# Patient Record
Sex: MALE | Race: WHITE | NOT HISPANIC OR LATINO | Employment: FULL TIME | URBAN - NONMETROPOLITAN AREA
[De-identification: names, ages, dates, MRNs, and addresses within clinical notes are randomized per-mention and may not be internally consistent; named-entity substitution may affect disease eponyms.]

---

## 2018-12-09 ENCOUNTER — HOSPITAL ENCOUNTER (EMERGENCY)
Facility: HOSPITAL | Age: 43
Discharge: HOME | End: 2018-12-09
Attending: EMERGENCY MEDICINE | Admitting: EMERGENCY MEDICINE

## 2018-12-09 VITALS
DIASTOLIC BLOOD PRESSURE: 80 MMHG | SYSTOLIC BLOOD PRESSURE: 141 MMHG | OXYGEN SATURATION: 96 % | WEIGHT: 196 LBS | RESPIRATION RATE: 16 BRPM | HEART RATE: 88 BPM

## 2018-12-09 DIAGNOSIS — R04.0 EPISTAXIS: Primary | ICD-10-CM

## 2018-12-09 PROCEDURE — 99283 EMERGENCY DEPT VISIT LOW MDM: CPT

## 2018-12-10 NOTE — ED PROVIDER NOTES
Subjective   43-year-old male presents to the ED with chief complaint of epistaxis.  The patient states that he had a nosebleed last night but took a few hours before and pressure to resolve.  He said he had a second nosebleed that started earlier today.  He tried packing the nose with toilet paper which did not stop the bleeding.  He denies lightheadedness or dizziness.  No known trauma or injury.  No other complaints at this time.            Review of Systems   HENT: Positive for nosebleeds.    All other systems reviewed and are negative.      History reviewed. No pertinent past medical history.    No Known Allergies    Past Surgical History:   Procedure Laterality Date   • APPENDECTOMY     • NASAL SEPTUM SURGERY         History reviewed. No pertinent family history.    Social History     Socioeconomic History   • Marital status:      Spouse name: Not on file   • Number of children: Not on file   • Years of education: Not on file   • Highest education level: Not on file   Tobacco Use   • Smoking status: Former Smoker   Substance and Sexual Activity   • Alcohol use: Yes     Frequency: Never     Comment: socially    • Drug use: No           Objective   Physical Exam   Constitutional: He is oriented to person, place, and time. He appears well-developed and well-nourished. No distress.   HENT:   Head: Normocephalic and atraumatic.   Nose: Nose normal.   Epistaxis, pressure device in place    Eyes: Conjunctivae and EOM are normal. Pupils are equal, round, and reactive to light.   Cardiovascular: Normal rate and regular rhythm.   Pulmonary/Chest: Effort normal and breath sounds normal. No respiratory distress.   Abdominal: Soft. He exhibits no distension. There is no tenderness.   Musculoskeletal: He exhibits no edema or deformity.   Neurological: He is alert and oriented to person, place, and time. No cranial nerve deficit. Coordination normal.   Skin: He is not diaphoretic.   Nursing note and vitals  reviewed.      Procedures           ED Course      Presented with epistaxis.  Bleeding was controlled with pressure.  Reexamination patient did have a large clot in the left naris.  I offered removal of the clot and packing which the patient refused.  Patient will be discharged follow-up as needed he is agreeable with this plan.          [facility]      Final diagnoses:   Epistaxis            Abel Busch, DO  12/10/18 0247

## 2019-01-21 ENCOUNTER — TRANSCRIBE ORDERS (OUTPATIENT)
Dept: ADMINISTRATIVE | Facility: HOSPITAL | Age: 44
End: 2019-01-21

## 2019-01-21 DIAGNOSIS — M54.2 NECK PAIN: Primary | ICD-10-CM

## 2019-01-22 ENCOUNTER — HOSPITAL ENCOUNTER (OUTPATIENT)
Dept: MRI IMAGING | Facility: HOSPITAL | Age: 44
Discharge: HOME | End: 2019-01-22
Admitting: CHIROPRACTOR

## 2019-01-22 DIAGNOSIS — M54.2 NECK PAIN: ICD-10-CM

## 2019-01-22 PROCEDURE — 72141 MRI NECK SPINE W/O DYE: CPT

## 2019-12-11 NOTE — PROGRESS NOTES
Patient: Ezekiel Ray    YOB: 1975    Date: 12/13/2019    Primary Care Provider: Ranulfo Douglass APRN    Reason for Consultation: Lesion    Chief Complaint   Patient presents with   • Cyst     right ear       Subjective .     History of present illness:  I saw the patient in the office  today as a consultation for evaluation and treatment of a cyst on the ear.  Patient has had this for the last 2 or so months.  He has had multiple episodes of inflammation and drainage relating to that cyst on the right earlobe.  He states that he has a significant amount of pain during those episodes.  Feeling better currently.    The following portions of the patient's history were reviewed and updated as appropriate: allergies, current medications, past family history, past medical history, past social history, past surgical history and problem list.    Review of Systems   Constitutional: Negative for chills, fever and unexpected weight change.   HENT: Negative for trouble swallowing and voice change.    Eyes: Negative for visual disturbance.   Respiratory: Negative for apnea, cough, chest tightness, shortness of breath and wheezing.    Cardiovascular: Negative for chest pain, palpitations and leg swelling.   Gastrointestinal: Negative for abdominal distention, abdominal pain, anal bleeding, blood in stool, constipation, diarrhea, nausea, rectal pain and vomiting.   Endocrine: Negative for cold intolerance and heat intolerance.   Genitourinary: Negative for difficulty urinating, dysuria, flank pain, scrotal swelling and testicular pain.   Musculoskeletal: Negative for back pain, gait problem and joint swelling.   Skin: Negative for color change, rash and wound.   Neurological: Negative for dizziness, syncope, speech difficulty, weakness, numbness and headaches.   Hematological: Negative for adenopathy. Does not bruise/bleed easily.   Psychiatric/Behavioral: Negative for confusion. The patient is not  "nervous/anxious.        History:  History reviewed. No pertinent past medical history.    Past Surgical History:   Procedure Laterality Date   • APPENDECTOMY     • NASAL SEPTUM SURGERY         Family History   Problem Relation Age of Onset   • No Known Problems Mother    • Cancer Father        Social History     Tobacco Use   • Smoking status: Former Smoker   • Smokeless tobacco: Never Used   Substance Use Topics   • Alcohol use: Yes     Frequency: Never     Comment: socially    • Drug use: No        Allergies:  No Known Allergies    Medications:    Current Outpatient Medications:   •  doxycycline (MONODOX) 100 MG capsule, 1 po bid, Disp: 20 capsule, Rfl: 0  •  lansoprazole (PREVACID) 30 MG capsule, lansoprazole 30 mg capsule,delayed release  Take one capsule every morning, Disp: , Rfl:   •  ondansetron ODT (ZOFRAN-ODT) 4 MG disintegrating tablet, Take 1 tablet by mouth Every 8 (Eight) Hours As Needed for Nausea or Vomiting., Disp: 15 tablet, Rfl: 0    Objective     Vital Signs:   Vitals:    12/13/19 0916   BP: 118/70   Pulse: 68   Temp: 97.4 °F (36.3 °C)   SpO2: 97%   Weight: 87.5 kg (193 lb)   Height: 180.3 cm (71\")       Physical Exam:     General Appearance:   Alert and oriented and in no acute distress   Skin: Right earlobe with findings consistent with a sebaceous cyst with chronic drainage site posteriorly in the earlobe     Results Review:   None    Review of Systems was reviewed and confirmed as accurate.    Assessment/Plan     1. Sebaceous cyst      I explained the diagnosis to him.  He understands that this will likely continue to recur without excision.  I have offered him excision and he understands the procedure and the reason for the procedure.  He also understands the risks of bleeding and infection as well as recurrence and he wishes to proceed    1% lidocaine was used locally after the right earlobe was prepped and draped in usual fashion.  An elliptical incision was made around the drainage site " posteriorly and deeper dissection was performed and full-thickness excision was performed.  6 mm in size.  Wound was closed with interrupted simple nylon closure.  Patient tolerated procedure well and there were no complications.  Low up next week for suture removal.  Wound care instructions were otherwise given.    Electronically signed by Brown Zeng MD  12/13/19  9:34 AM

## 2019-12-13 ENCOUNTER — OFFICE VISIT (OUTPATIENT)
Dept: SURGERY | Facility: CLINIC | Age: 44
End: 2019-12-13

## 2019-12-13 VITALS
OXYGEN SATURATION: 97 % | HEART RATE: 68 BPM | BODY MASS INDEX: 27.02 KG/M2 | HEIGHT: 71 IN | TEMPERATURE: 97.4 F | DIASTOLIC BLOOD PRESSURE: 70 MMHG | SYSTOLIC BLOOD PRESSURE: 118 MMHG | WEIGHT: 193 LBS

## 2019-12-13 DIAGNOSIS — L72.3 SEBACEOUS CYST: Primary | ICD-10-CM

## 2019-12-13 PROCEDURE — 99202 OFFICE O/P NEW SF 15 MIN: CPT | Performed by: SURGERY

## 2019-12-13 PROCEDURE — 11441 EXC FACE-MM B9+MARG 0.6-1 CM: CPT | Performed by: SURGERY

## 2019-12-18 ENCOUNTER — OFFICE VISIT (OUTPATIENT)
Dept: SURGERY | Facility: CLINIC | Age: 44
End: 2019-12-18

## 2019-12-18 VITALS
HEART RATE: 74 BPM | HEIGHT: 71 IN | BODY MASS INDEX: 27.02 KG/M2 | WEIGHT: 193 LBS | SYSTOLIC BLOOD PRESSURE: 118 MMHG | TEMPERATURE: 97.6 F | OXYGEN SATURATION: 97 % | DIASTOLIC BLOOD PRESSURE: 70 MMHG | RESPIRATION RATE: 16 BRPM

## 2019-12-18 DIAGNOSIS — L72.3 SEBACEOUS CYST: Primary | ICD-10-CM

## 2019-12-18 DIAGNOSIS — Z48.02 ENCOUNTER FOR REMOVAL OF SUTURES: ICD-10-CM

## 2019-12-18 NOTE — PROGRESS NOTES
Patient comes in today to have suture removal from a recent excision of sebaceous cyst @ right posterior earlobe. The sutures were removed and there was no redness or drainage from the sight. The patient has no complaints.

## 2020-02-13 ENCOUNTER — HOSPITAL ENCOUNTER (OUTPATIENT)
Dept: GENERAL RADIOLOGY | Facility: HOSPITAL | Age: 45
Discharge: HOME | End: 2020-02-13
Admitting: CHIROPRACTOR

## 2020-02-13 ENCOUNTER — TRANSCRIBE ORDERS (OUTPATIENT)
Dept: GENERAL RADIOLOGY | Facility: HOSPITAL | Age: 45
End: 2020-02-13

## 2020-02-13 DIAGNOSIS — M54.50 LOW BACK PAIN, UNSPECIFIED BACK PAIN LATERALITY, UNSPECIFIED CHRONICITY, UNSPECIFIED WHETHER SCIATICA PRESENT: Primary | ICD-10-CM

## 2020-02-13 PROCEDURE — 72100 X-RAY EXAM L-S SPINE 2/3 VWS: CPT

## 2020-07-30 ENCOUNTER — TRANSCRIBE ORDERS (OUTPATIENT)
Dept: MRI IMAGING | Facility: HOSPITAL | Age: 45
End: 2020-07-30

## 2020-07-30 DIAGNOSIS — M54.50 LOW BACK PAIN, UNSPECIFIED BACK PAIN LATERALITY, UNSPECIFIED CHRONICITY, UNSPECIFIED WHETHER SCIATICA PRESENT: Primary | ICD-10-CM

## 2020-08-18 ENCOUNTER — HOSPITAL ENCOUNTER (OUTPATIENT)
Dept: MRI IMAGING | Facility: HOSPITAL | Age: 45
Discharge: HOME | End: 2020-08-18
Admitting: ORTHOPAEDIC SURGERY

## 2020-08-18 DIAGNOSIS — M54.50 LOW BACK PAIN, UNSPECIFIED BACK PAIN LATERALITY, UNSPECIFIED CHRONICITY, UNSPECIFIED WHETHER SCIATICA PRESENT: ICD-10-CM

## 2020-08-18 PROCEDURE — 72148 MRI LUMBAR SPINE W/O DYE: CPT

## 2021-08-09 ENCOUNTER — OFFICE VISIT (OUTPATIENT)
Dept: SLEEP MEDICINE | Facility: CLINIC | Age: 46
End: 2021-08-09

## 2021-08-09 VITALS
WEIGHT: 197 LBS | BODY MASS INDEX: 27.58 KG/M2 | HEIGHT: 71 IN | HEART RATE: 78 BPM | OXYGEN SATURATION: 98 % | SYSTOLIC BLOOD PRESSURE: 130 MMHG | DIASTOLIC BLOOD PRESSURE: 81 MMHG

## 2021-08-09 DIAGNOSIS — R06.83 SNORING: Primary | ICD-10-CM

## 2021-08-09 DIAGNOSIS — G47.33 OBSTRUCTIVE SLEEP APNEA, ADULT: ICD-10-CM

## 2021-08-09 DIAGNOSIS — E66.3 OVERWEIGHT: ICD-10-CM

## 2021-08-09 PROCEDURE — 99203 OFFICE O/P NEW LOW 30 MIN: CPT | Performed by: INTERNAL MEDICINE

## 2021-08-10 NOTE — PROGRESS NOTES
"Subjective   Ezekiel Ray is a 46 y.o. male is being seen for consultation today at the request of Mariposa Sutherland DO for the evaluation of snoring and obstructive sleep apnea.  He is also seen by SABA Bradford he is seen in person in the sleep clinic.    History of Present Illness  Patient says he has had snoring \"all my life\".  He had a sleep study in 2014 and was diagnosed with obstructive sleep apnea.  He has been on CPAP since then.  He says he has done very well with his CPAP and feels rested.  He denies morning headaches.  He thinks his current machine is couple years old.  He is on a CPAP of 11.    When he uses the mask he generally feels rested.  If he does not use the mask he still has snoring.  He has occasional kicking of his legs at night.  He has some chronic pain occasionally.  He has broken his nose previously and sees Dr. Rudolph.  He has had surgery for this.  He also has history of allergies.  Before using CPAP machine he was having palpitations.    He goes to bed about 11:30 PM.  He falls asleep quickly.  He denies awakening during the night.  He gets 5 to 7 hours sleep and usually feels rested with the machine.  He denies any history of hypertension, diabetes, coronary artery disease.  Allergies   Allergen Reactions   • Molds & Smuts Unknown - Low Severity   • Tree Extract Unknown - Low Severity          Current Outpatient Medications:   •  doxycycline (MONODOX) 100 MG capsule, 1 po bid, Disp: 20 capsule, Rfl: 0  •  Omeprazole (PRILOSEC PO), omeprazole, Disp: , Rfl:   •  ondansetron ODT (ZOFRAN-ODT) 4 MG disintegrating tablet, Take 1 tablet by mouth Every 8 (Eight) Hours As Needed for Nausea or Vomiting., Disp: 15 tablet, Rfl: 0    Social History    Tobacco Use      Smoking status: Former Smoker      Smokeless tobacco: Never Used       Social History     Substance and Sexual Activity   Alcohol Use  he has 1-2 drinks per day    Comment: daily       Caffeine: He has 2 cups " "of coffee per day     History reviewed.  He had a history of asthma as a child    Past Surgical History:   Procedure Laterality Date   • APPENDECTOMY     • NASAL SEPTUM SURGERY     He had wisdom teeth extraction    Family History   Problem Relation Age of Onset   • No Known Problems Mother    • Cancer Father    Family history is positive for heart disease and asthma    The following portions of the patient's history were reviewed and updated as appropriate: allergies, current medications, past family history, past medical history, past social history, past surgical history and problem list.    Review of Systems   Constitutional: Negative.    HENT: Positive for congestion, rhinorrhea, sinus pressure, sinus pain and sneezing.    Eyes: Positive for discharge.   Respiratory: Positive for apnea.    Cardiovascular: Positive for palpitations.   Gastrointestinal: Negative.    Endocrine: Negative.    Genitourinary: Negative.    Musculoskeletal: Positive for back pain, neck pain and neck stiffness.   Skin: Negative.    Allergic/Immunologic: Positive for environmental allergies.   Neurological: Positive for dizziness, light-headedness and headaches.   Hematological: Negative.    Psychiatric/Behavioral: Negative.    Before getting the machine [place] score was 21/24    Objective     /81 (BP Location: Left arm, Patient Position: Sitting, Cuff Size: Adult)   Pulse 78   Ht 180.3 cm (71\")   Wt 89.4 kg (197 lb)   SpO2 98%   BMI 27.48 kg/m²      Physical Exam  Patient appears to be awake and alert.  He is not appear to be in acute respiratory distress.    He is normocephalic.  He has Mallampati class IV anatomy.    Lungs are clear.    Cardiac exam revealed normal S1-S2.    Extremities showed no edema.    Sleep study September 7, 2014 showed an AHI of 30.6.    Download from his machine shows excellent compliance.  He uses it essentially every night.  His AHI is normal 3.9.    Assessment/Plan   Diagnoses and all orders for " this visit:    1. Snoring (Primary)  -     Detailed AutoPAP Order  -     Detailed AutoPAP Order    2. Obstructive sleep apnea, adult  -     Detailed AutoPAP Order  -     Detailed AutoPAP Order    3. Overweight    Patient does have severe obstructive sleep apnea.  He has excellent control of his respiratory events on his CPAP.  He wishes to continue on CPAP.  He was not aware that his machine has been recalled.  He is to contact his [facility] and [facility] regarding replacement.  We will order him a replacement.  We will also place an order for new supplies for now.  We have discussed other potential therapies including weight control, oral appliance and surgery.  We have also discussed the long-term consequences of untreated obstructive sleep apnea.  These include hypertension, diabetes, heart disease, stroke, and dementia.  He is encouraged to maintain ideal body weight.  He is encouraged to avoid alcohol or sedatives close to bedtime.  He is encouraged to practice lateral position sleep.  We will plan to see him back in 2 months.  We can hopefully download his new machine at that point reassess situation.  We have discussed the relative risk of continuing to use his current CPAP machine versus the risk of untreated obstructive sleep apnea.  He is consider those and make his decision.    Total time: 35 minutes exclusive of procedures.         Remigio Medina MD [facility]  Sleep Medicine  Pulmonary and Critical Care Medicine

## 2022-08-04 ENCOUNTER — OFFICE VISIT (OUTPATIENT)
Dept: PULMONOLOGY | Facility: CLINIC | Age: 47
End: 2022-08-04

## 2022-08-04 VITALS
SYSTOLIC BLOOD PRESSURE: 110 MMHG | RESPIRATION RATE: 14 BRPM | DIASTOLIC BLOOD PRESSURE: 76 MMHG | WEIGHT: 199 LBS | HEIGHT: 71 IN | OXYGEN SATURATION: 98 % | BODY MASS INDEX: 27.86 KG/M2 | HEART RATE: 92 BPM

## 2022-08-04 DIAGNOSIS — G47.33 OSA (OBSTRUCTIVE SLEEP APNEA): Primary | ICD-10-CM

## 2022-08-04 PROCEDURE — 99213 OFFICE O/P EST LOW 20 MIN: CPT | Performed by: NURSE PRACTITIONER

## 2022-08-04 RX ORDER — FLUTICASONE PROPIONATE 50 MCG
SPRAY, SUSPENSION (ML) NASAL
COMMUNITY

## 2022-08-04 NOTE — PROGRESS NOTES
"Chief Complaint   Patient presents with   • Sleeping Problem     Referral          Subjective   Ezekiel Ray is a 47 y.o. male.     History of Present Illness   The patient comes in today for f/u of CLAUDETTE.    He saw Dr. Medina 8/2021 and had not followed up with him since and then found out that Dr. Medina was no longer there and called our office for an appt.     He is needing new CPAP supplies.     Patient says that he is compliant with his device and using it regularly.    Patient's symptoms of sleep disturbance and daytime sleepiness have been helped greatly with the use of PAP device, as prescribed. He feels rested most days upon awakening.     He denies SOB and exercises reguarly.     He quit smoking 5 years ago.     He uses flonase for AR symptoms.     The following portions of the patient's history were reviewed and updated as appropriate: allergies, current medications, past family history, past medical history, past social history and past surgical history.    Review of Systems   Constitutional: Negative for chills.   HENT: Negative for sneezing.    Respiratory: Negative for shortness of breath.    Psychiatric/Behavioral: Negative for sleep disturbance.       Objective   Visit Vitals  /76   Pulse 92   Resp 14   Ht 180.3 cm (71\")   Wt 90.3 kg (199 lb)   SpO2 98%   BMI 27.75 kg/m²         Physical Exam  Vitals reviewed.   Constitutional:       Appearance: He is well-developed.   HENT:      Head: Atraumatic.      Mouth/Throat:      Mouth: Mucous membranes are moist.      Comments: Crowded oropharynx.  Eyes:      Extraocular Movements: Extraocular movements intact.   Musculoskeletal:      Comments: Gait normal.   Skin:     General: Skin is warm.   Neurological:      Mental Status: He is alert and oriented to person, place, and time.           Assessment & Plan   Diagnoses and all orders for this visit:    1. CLAUDETTE (obstructive sleep apnea) (Primary)  -     BIPAP / CPAP Adjustment           Return in " about 11 months (around 7/4/2023) for Recheck, For Dr. Allen, Sleep ONLY, Update PCP!.    DISCUSSION (if any):  I reviewed his last sleep study which showed severe CLAUDETTE with AHI of 30.6 per hour. This was from 2014.     I have placed an order for new supplies.     Continue treatment with CPAP at a pressure of 11, with a nasal mask.    Patient's compliance data was reviewed and the compliance is greater than 80%.    Humidification setup, hose and mask care discussed.    Use every night for at least 4 hours stressed.    A total of 20 minutes was spent reviewing all the information available including reviewing previous history from PCP/sleep specialist, as applicable, reviewing available sleep studies/compliance data.  This also included discussion regarding importance of sleep hygiene and possible lifestyle modifications, if applicable/necessary, that may impact sleep.  Time was also spent documenting information in electronic health record and placing orders, as appropriate and applicable.      Dictated utilizing Dragon dictation.    This document was electronically signed by SABA Pierson August 4, 2022  16:05 EDT

## 2022-09-20 DIAGNOSIS — G47.33 OSA (OBSTRUCTIVE SLEEP APNEA): Primary | ICD-10-CM

## 2024-07-25 ENCOUNTER — HOSPITAL ENCOUNTER (OUTPATIENT)
Dept: ULTRASOUND IMAGING | Facility: HOSPITAL | Age: 49
Discharge: HOME | End: 2024-07-25
Payer: COMMERCIAL

## 2024-07-25 ENCOUNTER — TRANSCRIBE ORDERS (OUTPATIENT)
Dept: ULTRASOUND IMAGING | Facility: HOSPITAL | Age: 49
End: 2024-07-25
Payer: COMMERCIAL

## 2024-07-25 DIAGNOSIS — M79.609 PAIN IN EXTREMITY, UNSPECIFIED EXTREMITY: Primary | ICD-10-CM

## 2024-07-25 DIAGNOSIS — M79.609 PAIN IN EXTREMITY, UNSPECIFIED EXTREMITY: ICD-10-CM

## 2024-07-25 PROCEDURE — 93971 EXTREMITY STUDY: CPT

## 2024-10-30 ENCOUNTER — OFFICE VISIT (OUTPATIENT)
Dept: PULMONOLOGY | Facility: CLINIC | Age: 49
End: 2024-10-30
Payer: COMMERCIAL

## 2024-10-30 VITALS
HEIGHT: 71 IN | WEIGHT: 185 LBS | SYSTOLIC BLOOD PRESSURE: 123 MMHG | BODY MASS INDEX: 25.9 KG/M2 | HEART RATE: 71 BPM | DIASTOLIC BLOOD PRESSURE: 73 MMHG | OXYGEN SATURATION: 100 %

## 2024-10-30 DIAGNOSIS — G47.33 OSA ON CPAP: Primary | ICD-10-CM

## 2024-10-30 NOTE — PROGRESS NOTES
Follow Up Office Visit      Patient Name: Ezekiel Ray    Chief Complaint:    Chief Complaint   Patient presents with    Sleeping Problem       History of Present Illness: Ezekiel Ray is a 49 y.o. male who is here today for follow up of severe sleep apnea. He was last seen in 2023. He is normally seen in the [place] office but missed follow up appointments and presents to the [facility] for the quickest appointment option as his DME will not provide PAP supplies until being seen. He notes that he remains compliant with CPAP use and feels well rested. Heated tubing helps to prevent dryness. He is using a nasal mask. DME is [employer]. He took his SD card to the DME yesterday for a compliance download.    Supplemental Oxygen: No    Subjective      Review of Systems:  Review of Systems   Constitutional:  Negative for fatigue, fever and unexpected weight change.   Respiratory:  Negative for shortness of breath.    Cardiovascular:  Negative for chest pain and leg swelling.        Past Medical History:   Past Medical History:   Diagnosis Date    Acid reflux        Past Surgical History:   Past Surgical History:   Procedure Laterality Date    APPENDECTOMY      NASAL SEPTUM SURGERY         Family History:   Family History   Problem Relation Age of Onset    No Known Problems Mother     Cancer Father        Social History:   Social History     Socioeconomic History    Marital status:    Tobacco Use    Smoking status: Former     Current packs/day: 0.00     Average packs/day: 1 pack/day for 18.0 years (18.0 ttl pk-yrs)     Types: Cigarettes     Start date: 1999     Quit date: 2017     Years since quittin.2     Passive exposure: Past    Smokeless tobacco: Never    Tobacco comments:     Occasionally smokes cigar / pipe/ 10/30/2024 bb   Vaping Use    Vaping status: Never Used   Substance and Sexual Activity    Alcohol use: Yes     Comment: daily    Drug use: Never    Sexual activity: Defer  "      Current Medications:     Current Outpatient Medications:     Omeprazole (PRILOSEC PO), omeprazole, Disp: , Rfl:      Allergies:   Allergies   Allergen Reactions    Molds & Smuts Unknown - Low Severity    Tree Extract Unknown - Low Severity       Objective     Physical Exam:  Vital Signs:   Vitals:    10/30/24 1340   BP: 123/73   Pulse: 71   SpO2: 100%   Weight: 83.9 kg (185 lb)   Height: 180.3 cm (71\")     Body mass index is 25.8 kg/m².    Physical Exam  Vitals reviewed.   Constitutional:       General: He is not in acute distress.     Appearance: He is not toxic-appearing.   HENT:      Head: Normocephalic and atraumatic.      Mouth/Throat:      Mouth: Mucous membranes are moist.   Eyes:      Extraocular Movements: Extraocular movements intact.      Conjunctiva/sclera: Conjunctivae normal.   Cardiovascular:      Rate and Rhythm: Normal rate.      Heart sounds: Normal heart sounds.   Pulmonary:      Effort: Pulmonary effort is normal.      Breath sounds: Normal breath sounds.   Abdominal:      General: There is no distension.      Palpations: Abdomen is soft.   Musculoskeletal:         General: No swelling.      Cervical back: Neck supple.   Skin:     General: Skin is warm and dry.      Findings: No rash.   Neurological:      General: No focal deficit present.      Mental Status: He is alert and oriented to person, place, and time.   Psychiatric:         Mood and Affect: Mood normal.         Behavior: Behavior normal.       Assessment / Plan      Assessment/Plan:   Diagnoses and all orders for this visit:    1. CLAUDETTE on CPAP (Primary)  -     PAP Therapy    Compliance report has been requested from Psychiatric for review.  Continue on CPAP nightly.  Good clinical response to treatment.  Orders written for new supplies.    Follow Up:   Return in about 1 year (around 10/30/2025).  The patient was counseled on diagnostic results, risks and benefits of treatment options, risk factor modifications and the importance of " treatment compliance. The patient was advised to contact the clinic with concerns or worsening symptoms.     SABA Crook   Pulmonary Medicine [place]     This document has been electronically signed by SABA Crook  October 30, 2024